# Patient Record
Sex: MALE
[De-identification: names, ages, dates, MRNs, and addresses within clinical notes are randomized per-mention and may not be internally consistent; named-entity substitution may affect disease eponyms.]

---

## 2018-07-03 PROBLEM — Z00.00 ENCOUNTER FOR PREVENTIVE HEALTH EXAMINATION: Status: ACTIVE | Noted: 2018-07-03

## 2018-08-30 ENCOUNTER — APPOINTMENT (OUTPATIENT)
Dept: NEUROLOGY | Facility: CLINIC | Age: 73
End: 2018-08-30

## 2022-03-29 ENCOUNTER — APPOINTMENT (OUTPATIENT)
Dept: ENDOCRINOLOGY | Facility: CLINIC | Age: 77
End: 2022-03-29
Payer: COMMERCIAL

## 2022-03-29 VITALS
WEIGHT: 145 LBS | HEIGHT: 64 IN | SYSTOLIC BLOOD PRESSURE: 152 MMHG | HEART RATE: 76 BPM | BODY MASS INDEX: 24.75 KG/M2 | DIASTOLIC BLOOD PRESSURE: 74 MMHG

## 2022-03-29 DIAGNOSIS — Z13.29 ENCOUNTER FOR SCREENING FOR OTHER SUSPECTED ENDOCRINE DISORDER: ICD-10-CM

## 2022-03-29 DIAGNOSIS — Z95.5 PRESENCE OF CORONARY ANGIOPLASTY IMPLANT AND GRAFT: ICD-10-CM

## 2022-03-29 DIAGNOSIS — F32.A ANXIETY DISORDER, UNSPECIFIED: ICD-10-CM

## 2022-03-29 DIAGNOSIS — Z78.9 OTHER SPECIFIED HEALTH STATUS: ICD-10-CM

## 2022-03-29 DIAGNOSIS — F41.9 ANXIETY DISORDER, UNSPECIFIED: ICD-10-CM

## 2022-03-29 DIAGNOSIS — R79.89 OTHER SPECIFIED ABNORMAL FINDINGS OF BLOOD CHEMISTRY: ICD-10-CM

## 2022-03-29 DIAGNOSIS — E24.9 CUSHING'S SYNDROME, UNSPECIFIED: ICD-10-CM

## 2022-03-29 PROCEDURE — 99204 OFFICE O/P NEW MOD 45 MIN: CPT

## 2022-03-29 RX ORDER — EVOLOCUMAB 140 MG/ML
INJECTION, SOLUTION SUBCUTANEOUS
Refills: 0 | Status: ACTIVE | COMMUNITY

## 2022-03-29 NOTE — HISTORY OF PRESENT ILLNESS
[FreeTextEntry1] : 77 y.o. male who was suffering from anxiety/depression but did not want medical intervention.\par He contacted some "natural supplements" companies which sent him kits o do urinary tests and informed him that his cortisol level was found to be high.\par The patient denies weight gain or appearance changes c/w Cushing's syndrome.

## 2022-03-29 NOTE — ASSESSMENT
[FreeTextEntry1] : Anxiety\par Depression\par R/O Cushing's syndrome (unlikely)\par Hyperlipidemia\par \par Lab. tests today\par F/U once the results are available

## 2022-04-01 LAB
ACTH SER-ACNC: 10.8 PG/ML
ALBUMIN SERPL ELPH-MCNC: 4.4 G/DL
ALP BLD-CCNC: 116 U/L
ALT SERPL-CCNC: 27 U/L
ANION GAP SERPL CALC-SCNC: 13 MMOL/L
AST SERPL-CCNC: 35 U/L
BASOPHILS # BLD AUTO: 0.03 K/UL
BASOPHILS NFR BLD AUTO: 0.4 %
BILIRUB SERPL-MCNC: 0.3 MG/DL
BUN SERPL-MCNC: 18 MG/DL
CALCIUM SERPL-MCNC: 9.3 MG/DL
CHLORIDE SERPL-SCNC: 104 MMOL/L
CHOLEST SERPL-MCNC: 201 MG/DL
CO2 SERPL-SCNC: 24 MMOL/L
CORTIS SERPL-MCNC: 5.4 UG/DL
CREAT SERPL-MCNC: 1.08 MG/DL
EGFR: 70 ML/MIN/1.73M2
EOSINOPHIL # BLD AUTO: 0.25 K/UL
EOSINOPHIL NFR BLD AUTO: 3.3 %
ESTIMATED AVERAGE GLUCOSE: 103 MG/DL
GLUCOSE SERPL-MCNC: 77 MG/DL
HBA1C MFR BLD HPLC: 5.2 %
HCT VFR BLD CALC: 42.2 %
HDLC SERPL-MCNC: 85 MG/DL
HGB BLD-MCNC: 13.6 G/DL
IMM GRANULOCYTES NFR BLD AUTO: 0.4 %
LDLC SERPL CALC-MCNC: 96 MG/DL
LYMPHOCYTES # BLD AUTO: 1.36 K/UL
LYMPHOCYTES NFR BLD AUTO: 17.8 %
MAN DIFF?: NORMAL
MCHC RBC-ENTMCNC: 31.5 PG
MCHC RBC-ENTMCNC: 32.2 GM/DL
MCV RBC AUTO: 97.7 FL
MONOCYTES # BLD AUTO: 0.79 K/UL
MONOCYTES NFR BLD AUTO: 10.3 %
NEUTROPHILS # BLD AUTO: 5.19 K/UL
NEUTROPHILS NFR BLD AUTO: 67.8 %
NONHDLC SERPL-MCNC: 116 MG/DL
PLATELET # BLD AUTO: 239 K/UL
POTASSIUM SERPL-SCNC: 4.8 MMOL/L
PROT SERPL-MCNC: 7 G/DL
RBC # BLD: 4.32 M/UL
RBC # FLD: 13.8 %
SODIUM SERPL-SCNC: 141 MMOL/L
T3 SERPL-MCNC: 135 NG/DL
T4 FREE SERPL-MCNC: 1.1 NG/DL
TRIGL SERPL-MCNC: 101 MG/DL
TSH SERPL-ACNC: 1.87 UIU/ML
WBC # FLD AUTO: 7.65 K/UL

## 2023-05-08 ENCOUNTER — APPOINTMENT (OUTPATIENT)
Dept: PULMONOLOGY | Facility: CLINIC | Age: 78
End: 2023-05-08
Payer: COMMERCIAL

## 2023-05-08 VITALS
HEIGHT: 67 IN | DIASTOLIC BLOOD PRESSURE: 79 MMHG | HEART RATE: 77 BPM | OXYGEN SATURATION: 95 % | TEMPERATURE: 96.7 F | WEIGHT: 155 LBS | SYSTOLIC BLOOD PRESSURE: 122 MMHG | BODY MASS INDEX: 24.33 KG/M2

## 2023-05-08 DIAGNOSIS — F51.12 INSUFFICIENT SLEEP SYNDROME: ICD-10-CM

## 2023-05-08 DIAGNOSIS — G47.33 OBSTRUCTIVE SLEEP APNEA (ADULT) (PEDIATRIC): ICD-10-CM

## 2023-05-08 PROCEDURE — 99203 OFFICE O/P NEW LOW 30 MIN: CPT

## 2023-05-09 PROBLEM — F51.12 INSUFFICIENT SLEEP SYNDROME: Status: ACTIVE | Noted: 2023-05-09

## 2023-05-09 NOTE — PHYSICAL EXAM
[General Appearance - Well Developed] : well developed [Normal Appearance] : normal appearance [Well Groomed] : well groomed [General Appearance - Well Nourished] : well nourished [No Deformities] : no deformities [General Appearance - In No Acute Distress] : no acute distress [Normal Conjunctiva] : the conjunctiva exhibited no abnormalities [Eyelids - No Xanthelasma] : the eyelids demonstrated no xanthelasmas [Normal Oropharynx] : normal oropharynx [Heart Rate And Rhythm] : heart rate was normal and rhythm regular [Heart Sounds] : normal S1 and S2 [Heart Sounds Gallop] : no gallops [Murmurs] : no murmurs [Heart Sounds Pericardial Friction Rub] : no pericardial rub [Auscultation Breath Sounds / Voice Sounds] : lungs were clear to auscultation bilaterally [Abnormal Walk] : normal gait [Musculoskeletal - Swelling] : no joint swelling seen [Motor Tone] : muscle strength and tone were normal [Nail Clubbing] : no clubbing of the fingernails [Cyanosis, Localized] : no localized cyanosis [Petechial Hemorrhages (___cm)] : no petechial hemorrhages [] : no ischemic changes

## 2023-05-09 NOTE — HISTORY OF PRESENT ILLNESS
[FreeTextEntry1] : 5/8/2023: Consultation for this very pleasant 78-year-old gentleman referred by Dr. Bahman Dooley for evaluation of obstructive sleep apnea.  He tells me he was originally diagnosed with sleep apnea 5 or 6 years ago at [place].  At the time he tells me he was feeling depressed and fatigued.  He had a sleep study and was told he had mild sleep apnea.  Treatment with CPAP was attempted but he never felt improved with this.  He is no longer being treated.  He tells me he feels his depression has resolved.  He currently goes to bed at 1 AM, falls asleep in less than 5 minutes, gets up at about 6:30 AM.  He has 1 or 2 awakenings on a typical night.  He has significant daytime sleepiness, with an [place] sleepiness score of 13 out of 24 points.  He is not told of snoring, does frequently awaken with a headache and nasal and sinus congestion.  There is no history of parasomnia, cataplexy, sleep paralysis, hypnagogic hallucinations.\par \par He is also treated for hypertension on lisinopril.  He had aortic valve replacement and coronary stenting 2 years ago.  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

## 2023-05-09 NOTE — ASSESSMENT
[FreeTextEntry1] : History of obstructive sleep apnea, excessive daytime sleepiness\par \par I am actually not convinced that his primary problem is obstructive sleep apnea, as he had little response to CPAP subjectively in the past.  I do not know how severe his sleep apnea was at that time.  He is clearly sleep deprived, probably getting about 5 hours sleep most nights, which I have told him his likely not enough.  I have asked him to try to increase his time in bed and I will evaluate sleep disordered breathing with a home sleep study at this time.\par \par The patient is advised that in addition to worsening sleep leading to daytime sleepiness, sleep apnea, at least when severe, may be associated with worsening hypertension and diabetes, and may be a risk factor for cardiovascular disease and stroke.

## 2024-04-09 ENCOUNTER — APPOINTMENT (OUTPATIENT)
Dept: VASCULAR SURGERY | Facility: CLINIC | Age: 79
End: 2024-04-09
Payer: COMMERCIAL

## 2024-04-09 VITALS
DIASTOLIC BLOOD PRESSURE: 74 MMHG | SYSTOLIC BLOOD PRESSURE: 145 MMHG | WEIGHT: 150 LBS | HEART RATE: 77 BPM | HEIGHT: 64 IN | BODY MASS INDEX: 25.61 KG/M2

## 2024-04-09 DIAGNOSIS — I25.84 ATHEROSCLEROTIC HEART DISEASE OF NATIVE CORONARY ARTERY W/OUT ANGINA PECTORIS: ICD-10-CM

## 2024-04-09 DIAGNOSIS — I25.10 ATHEROSCLEROTIC HEART DISEASE OF NATIVE CORONARY ARTERY W/OUT ANGINA PECTORIS: ICD-10-CM

## 2024-04-09 DIAGNOSIS — I10 ESSENTIAL (PRIMARY) HYPERTENSION: ICD-10-CM

## 2024-04-09 DIAGNOSIS — I65.23 OCCLUSION AND STENOSIS OF BILATERAL CAROTID ARTERIES: ICD-10-CM

## 2024-04-09 PROCEDURE — 93880 EXTRACRANIAL BILAT STUDY: CPT

## 2024-04-09 PROCEDURE — 99204 OFFICE O/P NEW MOD 45 MIN: CPT

## 2024-04-09 RX ORDER — KRILL/OM-3/DHA/EPA/PHOSPHO/AST 1000-230MG
81 CAPSULE ORAL
Refills: 0 | Status: ACTIVE | COMMUNITY

## 2024-04-09 RX ORDER — LISINOPRIL 10 MG/1
10 TABLET ORAL
Refills: 0 | Status: ACTIVE | COMMUNITY

## 2024-04-09 NOTE — ADDENDUM
[FreeTextEntry1] : This note was written by Cayetano Sanderson, acting as a scribe for Dr. Sawyer Trevizo.  I, Dr. Sawyer Trevizo, have read and attest that all the information, medical decision-making, and discharge instructions within are true and accurate.  I, Dr. Sawyer Trevizo, personally performed the evaluation and management (E/M) services for this new patient.  That E/M includes conducting the initial examination, assessing all conditions, and establishing the plan of care.  Today, my ACP, Cayetano Sanderson, was here to observe my evaluation and management services for this patient to be followed going forward.

## 2024-04-09 NOTE — HISTORY OF PRESENT ILLNESS
[FreeTextEntry1] : 79yoM teacher w/PMHx of HTN, HLD, CAD s/p PTCA x 1 (2021, [facility]), TAVR (2022, [facility]), no smoking hx, screened for ICA stenosis by Dr. Bahman Dooley who identified moderate R ICA stenosis, referred to vascular for reevaluation.  Pt denies any previous h/o neurologic deficits, but does report family h/o CVA/MI (father).  He is compliant w/medical therapy including ASA, repatha, lisinopril for his HTN.

## 2024-04-09 NOTE — ASSESSMENT
[FreeTextEntry1] : 79yoM teacher w/PMHx of HTN, HLD, CAD s/p PTCA x 1 (2021, [facility]), TAVR (2022, [facility]), no smoking hx, screened for ICA stenosis by Dr. Bahman Dooley who identified moderate R ICA stenosis, referred to vascular for reevaluation.  Pt denies any previous h/o neurologic deficits, but does report family h/o CVA/MI (father).  He is compliant w/medical therapy including ASA, repatha, lisinopril for his HTN.  Normal neuro exam noted today and carotid duplex performed to evaluate for ICA stenosis demonstrates moderate R ICA stenosis, mild L ICA stenosis, vertebral arteries widely patent w/antegrade flow b/l.  Reassured pt that his carotid stenosis may be managed medically at this time as it is <80% and asymptomatic, but should be reimaged in 6mos to assess for progression of disease.

## 2024-04-09 NOTE — PROCEDURE
[FreeTextEntry1] : Carotid duplex performed to evaluate for ICA stenosis demonstrates moderate R ICA stenosis, mild L ICA stenosis, vertebral arteries widely patent w/antegrade flow b/l.

## 2024-04-09 NOTE — PHYSICAL EXAM
[JVD] : no jugular venous distention  [Normal Thyroid] : the thyroid was normal [Carotid Bruits] : no carotid bruits [Normal Breath Sounds] : Normal breath sounds [Respiratory Effort] : normal respiratory effort [Normal Heart Sounds] : normal heart sounds [Normal Rate and Rhythm] : normal rate and rhythm [Right Carotid Bruit] : no bruit heard over the right carotid [Left Carotid Bruit] : no bruit heard over the left carotid [2+] : left 2+ [No Rash or Lesion] : No rash or lesion [Purpura] : no purpura  [Petechiae] : no petechiae [Skin Ulcer] : no ulcer [Skin Induration] : no induration [Alert] : alert [Calm] : calm [de-identified] : Healthy, NAD [de-identified] : NC/AT, ashlieictloreta, EOMIx6 [de-identified] : FROM throughout, strength 5/5x4, NEG Romberg/Pronator drift [de-identified] : CNII-XII grossly intact